# Patient Record
Sex: MALE | Race: BLACK OR AFRICAN AMERICAN | NOT HISPANIC OR LATINO | Employment: FULL TIME | URBAN - METROPOLITAN AREA
[De-identification: names, ages, dates, MRNs, and addresses within clinical notes are randomized per-mention and may not be internally consistent; named-entity substitution may affect disease eponyms.]

---

## 2024-04-29 ENCOUNTER — OFFICE VISIT (OUTPATIENT)
Dept: FAMILY MEDICINE | Facility: CLINIC | Age: 29
End: 2024-04-29
Payer: COMMERCIAL

## 2024-04-29 VITALS
WEIGHT: 137.1 LBS | OXYGEN SATURATION: 100 % | HEIGHT: 67 IN | BODY MASS INDEX: 21.52 KG/M2 | DIASTOLIC BLOOD PRESSURE: 60 MMHG | RESPIRATION RATE: 18 BRPM | TEMPERATURE: 97.8 F | SYSTOLIC BLOOD PRESSURE: 98 MMHG | HEART RATE: 62 BPM

## 2024-04-29 DIAGNOSIS — E83.119 HEMOCHROMATOSIS, UNSPECIFIED HEMOCHROMATOSIS TYPE: ICD-10-CM

## 2024-04-29 DIAGNOSIS — M54.50 CHRONIC BILATERAL LOW BACK PAIN WITHOUT SCIATICA: ICD-10-CM

## 2024-04-29 DIAGNOSIS — Z11.4 SCREENING FOR HIV (HUMAN IMMUNODEFICIENCY VIRUS): ICD-10-CM

## 2024-04-29 DIAGNOSIS — Z13.1 SCREENING FOR DIABETES MELLITUS: ICD-10-CM

## 2024-04-29 DIAGNOSIS — Z13.220 LIPID SCREENING: ICD-10-CM

## 2024-04-29 DIAGNOSIS — Z11.59 NEED FOR HEPATITIS C SCREENING TEST: ICD-10-CM

## 2024-04-29 DIAGNOSIS — D57.20 SICKLE CELL-HEMOGLOBIN C DISEASE WITHOUT CRISIS (H): ICD-10-CM

## 2024-04-29 DIAGNOSIS — G89.29 CHRONIC BILATERAL LOW BACK PAIN WITHOUT SCIATICA: ICD-10-CM

## 2024-04-29 DIAGNOSIS — Z00.00 ROUTINE GENERAL MEDICAL EXAMINATION AT A HEALTH CARE FACILITY: Primary | ICD-10-CM

## 2024-04-29 PROCEDURE — 36415 COLL VENOUS BLD VENIPUNCTURE: CPT | Performed by: NURSE PRACTITIONER

## 2024-04-29 PROCEDURE — 86803 HEPATITIS C AB TEST: CPT | Performed by: NURSE PRACTITIONER

## 2024-04-29 PROCEDURE — 87389 HIV-1 AG W/HIV-1&-2 AB AG IA: CPT | Performed by: NURSE PRACTITIONER

## 2024-04-29 PROCEDURE — 99213 OFFICE O/P EST LOW 20 MIN: CPT | Mod: 25 | Performed by: NURSE PRACTITIONER

## 2024-04-29 PROCEDURE — 80061 LIPID PANEL: CPT | Performed by: NURSE PRACTITIONER

## 2024-04-29 PROCEDURE — 99385 PREV VISIT NEW AGE 18-39: CPT | Performed by: NURSE PRACTITIONER

## 2024-04-29 PROCEDURE — 82947 ASSAY GLUCOSE BLOOD QUANT: CPT | Performed by: NURSE PRACTITIONER

## 2024-04-29 RX ORDER — FOLIC ACID 1 MG/1
1 TABLET ORAL
COMMUNITY
Start: 2024-01-15

## 2024-04-29 SDOH — HEALTH STABILITY: PHYSICAL HEALTH: ON AVERAGE, HOW MANY DAYS PER WEEK DO YOU ENGAGE IN MODERATE TO STRENUOUS EXERCISE (LIKE A BRISK WALK)?: 4 DAYS

## 2024-04-29 SDOH — HEALTH STABILITY: PHYSICAL HEALTH: ON AVERAGE, HOW MANY MINUTES DO YOU ENGAGE IN EXERCISE AT THIS LEVEL?: 30 MIN

## 2024-04-29 ASSESSMENT — SOCIAL DETERMINANTS OF HEALTH (SDOH): HOW OFTEN DO YOU GET TOGETHER WITH FRIENDS OR RELATIVES?: TWICE A WEEK

## 2024-04-29 NOTE — COMMUNITY RESOURCES LIST (ENGLISH)
April 29, 2024           YOUR PERSONALIZED LIST OF SERVICES & PROGRAMS               Bill Payment Assistance      [employer] [place] [employer] - [facility] [employer] [facility] [place]  [address] [place] [address] (Distance: 1.5 miles)  Phone: [phone]  Language: English  Fee: Free  Accessibility: Ada accessible      [facility] - Utility payment assistance  [address] [place] [address] (Distance: 0.5 miles)  Language: English  Fee: Free      - Dislocated Worker/Adult [employer] Employment Program  Phone: [phone]  Email: [email or web address]  Website: [email or web address]/services/employment-services/[email or web address]-worker-program/  Language: English, Chinese  Hours: Mon 8:00 AM - 4:30 PM Tue 8:00 AM - 4:30 PM Wed 8:00 AM - 4:30 PM Thu 8:00 AM - 4:30 PM Fri 8:00 AM - 4:30 PM  Fee: Free  Accessibility: Ada accessible               IMPORTANT NUMBERS & WEBSITES        Emergency Services  911  .   [facility]  211 http://211unitedway.org  .   Poison Control  (667) 222-6626 http://[place]poison.org http://wis[place]poison.org  .     Suicide and Crisis Lifeline  988 http://988lifeline.org  .   Childhelp "[place] Child Abuse Hotline  454.148.2784 http://Childhelphotline.org   .   "[place]" Sexual Assault Hotline  (513) 831-6700 (HOPE) http://Rainn.org   .     National Runaway Safeline  (753) 147-2035 ([place]) http://1800runaway.org  .   Pregnancy & Postpartum Support  Call/text 663-399-0887  [place]: http://ppsupportmn.org  [place]: http://Novafora.com/wi  .   Substance Abuse National Helpline ([facility])  344-470-HELP (6701) http://Findtreatment.gov   .                DISCLAIMER: These resources have been generated via the [employer] Platform. [employer] does not endorse any service providers mentioned in this resource list. [employer] does not guarantee that the services mentioned in this resource list will be available to you or will improve your health or wellness.    [facility]

## 2024-04-29 NOTE — COMMUNITY RESOURCES LIST (ENGLISH)
April 29, 2024           YOUR PERSONALIZED LIST OF SERVICES & PROGRAMS               Bill Payment Assistance      [employer] [place] - [employer] - [facility] [employer] [facility] [place]  [address] [place] [address] (Distance: 1.5 miles)  Phone: [phone]  Language: English  Fee: Free  Accessibility: Ada accessible      [facility] - Utility payment assistance  [address] [place] [address] (Distance: 0.5 miles)  Language: English  Fee: Free      - Dislocated Worker/Adult [employer] Employment Program  Phone: [phone]  Email: [email or web address]  Website: [email or web address]-services/[email or web address]-worker-program/  Language: English, Kittitian  Hours: Mon 8:00 AM - 4:30 PM Tue 8:00 AM - 4:30 PM Wed 8:00 AM - 4:30 PM Thu 8:00 AM - 4:30 PM Fri 8:00 AM - 4:30 PM  Fee: Free  Accessibility: Ada accessible               IMPORTANT NUMBERS & WEBSITES        Emergency Services  911  .   [facility]  211 http://211unitedway.org  .   Poison Control  (268) 331-7824 http://[place]poison.org http://wis[place]poison.org  .     Suicide and Crisis Lifeline  988 http://988lifeline.org  .   Childhelp [place] Child Abuse Hotline  362.984.3307 http://Childhelphotline.org   .   [place] Sexual Assault Hotline  (321) 106-4149 (HOPE) http://Rainn.org   .     National Runaway Safeline  (281) 642-5644 ([place]) http://1800runaway.org  .   Pregnancy & Postpartum Support  Call/text 233-637-7742  [place]: http://ppsupportmn.org  [place]: http://TouchLocal.com/wi  .   Substance Abuse National Helpline ([facility])  608-381-HELP (0646) http://Findtreatment.gov   .                DISCLAIMER: These resources have been generated via the [employer] Platform. [employer] does not endorse any service providers mentioned in this resource list. [employer] does not guarantee that the services mentioned in this resource list will be available to you or will improve your health or wellness.    [facility]

## 2024-04-29 NOTE — PROGRESS NOTES
Assessment & Plan     ICD-10-CM    1. Routine general medical examination at a health care facility  Z00.00       2. Screening for HIV (human immunodeficiency virus)  Z11.4 HIV Antigen Antibody Combo     HIV Antigen Antibody Combo      3. Need for hepatitis C screening test  Z11.59 Hepatitis C Screen Reflex to HCV RNA Quant and Genotype     Hepatitis C Screen Reflex to HCV RNA Quant and Genotype      4. Chronic bilateral low back pain without sciatica    No red flag symptoms  Refer to PT and if not improving Follow-up as indicated  M54.50 Physical Therapy  Referral    G89.29       5. Lipid screening  Z13.220 Lipid Profile (Chol, Trig, HDL, LDL calc)     Lipid Profile (Chol, Trig, HDL, LDL calc)      6. Screening for diabetes mellitus  Z13.1 Glucose     Glucose      7. 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 Hemochromatosis, unspecified hemochromatosis type    Uncertain care plan for this diagnosis  Patient is scheduled follow up to assure back pain improving and discuss possible hemachromatosis diagnosis further. E83.119           MARJAN Laguerre CNP  [facility] [place]  ============================================  Subjective   Donato Galicia is a 29 year old male   here for a Physical Exam    Chronic bilateral low back pain without sciatica  Ongoing back back pain which is bilateral.  Not associated with any bowel or bladder control problems not associated with any radiation into legs.  No fever or chills.  No worsening at night.  Making it difficult to do activities of daily living but has not changed work patterns or home activities at this time.  Is interested in physical therapy referral.    Sickle cell-hemoglobin C disease (H)  Patient has not had crisis since 2020.      Hemochromatosis  Patient currently not being seen for this issue.  To follow up with him and further clarify this diagnosis.      Health Care  Directive    Patient Care Team:  Ada Wharton APRN CNP as PCP - General (Family Practice)  Yasir Dempsey MD as MD (Dermatology)        4/29/2024   General Health   How would you rate your overall physical health? Good   Feel stress (tense, anxious, or unable to sleep) Not at all         4/29/2024   Nutrition   Three or more servings of calcium each day? (!) NO   Diet: Regular (no restrictions)   How many servings of fruit and vegetables per day? (!) 2-3   How many sweetened beverages each day? 0-1         4/29/2024   Exercise   Days per week of moderate/strenous exercise 4 days   Average minutes spent exercising at this level 30 min           4/29/2024   Social Factors   Frequency of gathering with friends or relatives Twice a week   Worry food won't last until get money to buy more No    No   Food not last or not have enough money for food? No    No   Do you have housing?  Yes    Yes   Are you worried about losing your housing? No    No   Lack of transportation? No    No   Unable to get utilities (heat,electricity)? No    Yes   Want help with housing or utility concern? No         4/29/2024   Dental   Dentist two times every year? Yes         4/29/2024   TB Screening   Were you born outside of the [place]? Yes         Today's PHQ-2 Score:       4/29/2024     3:50 PM   PHQ-2 ( 1999 [employer])   Q1: Little interest or pleasure in doing things 2   Q2: Feeling down, depressed or hopeless 0   PHQ-2 Score 2   Q1: Little interest or pleasure in doing things More than half the days   Q2: Feeling down, depressed or hopeless Not at all   PHQ-2 Score 2             4/29/2024   Substance Use   Alcohol more than 3/day or more than 7/wk No   Do you use any other substances recreationally? No       Social History     Tobacco Use    Smoking status: Never    Smokeless tobacco: Never             4/29/2024   One time HIV Screening   Previous HIV test? No         4/29/2024   STI Screening   New sexual partner(s) since last STI/HIV test?  "No         4/29/2024   Contraception/Family Planning   Questions about contraception or family planning No        The Following Health Maintenance Topics are reviewed and are correct as of today:  Health Maintenance   Topic Date Due    ADVANCE CARE PLANNING  Never done    MENINGITIS IMMUNIZATION (1 - Risk 2-dose series) Never done    Pneumococcal Vaccine: Pediatrics (0 to 5 Years) and At-Risk Patients (6 to 64 Years) (1 of 2 - PCV) Never done    HEPATITIS B IMMUNIZATION (1 of 3 - 19+ 3-dose series) Never done    DTAP/TDAP/TD IMMUNIZATION (1 - Tdap) Never done    COVID-19 Vaccine (3 - 2023-24 season) 09/01/2023    INFLUENZA VACCINE (Season Ended) 09/01/2024    YEARLY PREVENTIVE VISIT  04/29/2025    ANNUAL REVIEW OF HM ORDERS  04/29/2025    HEPATITIS C SCREENING  Completed    HIV SCREENING  Completed    PHQ-2 (once per calendar year)  Completed    IPV IMMUNIZATION  Aged Out    HPV IMMUNIZATION  Aged Out    RSV MONOCLONAL ANTIBODY  Aged Out     Reviewed and updated as needed this visit by Provider   Tobacco  Allergies  Meds  Problems  Med Hx  Surg Hx  Fam Hx           HPI  Review of Systems   Constitutional:  Negative for chills, fever and unexpected weight change.   HENT: Negative.     Respiratory:  Negative for chest tightness and shortness of breath.    Cardiovascular:  Negative for chest pain and palpitations.   Gastrointestinal:  Negative for abdominal pain, constipation and diarrhea.   Genitourinary:  Negative for dysuria.   Musculoskeletal:  Positive for back pain.   Skin:  Negative for rash.   Neurological: Negative.  Negative for weakness and paresthesias.   Psychiatric/Behavioral: Negative.       ============================================    Objective    Exam  BP 98/60 (BP Location: Right arm, Patient Position: Sitting, Cuff Size: Adult Regular)   Pulse 62   Temp 97.8  F (36.6  C) (Oral)   Resp 18   Ht 1.702 m (5' 7\")   Wt 62.2 kg (137 lb 1.6 oz)   SpO2 100%   BMI 21.47 kg/m    Physical " Exam  Constitutional:       Appearance: Normal appearance.   HENT:      Right Ear: Tympanic membrane normal.      Left Ear: Tympanic membrane normal.      Nose: Nose normal.      Mouth/Throat:      Mouth: Mucous membranes are moist.      Pharynx: Oropharynx is clear.   Eyes:      Conjunctiva/sclera: Conjunctivae normal.   Cardiovascular:      Rate and Rhythm: Normal rate and regular rhythm.      Heart sounds: Normal heart sounds.   Pulmonary:      Effort: Pulmonary effort is normal.      Breath sounds: Normal breath sounds.   Abdominal:      General: Abdomen is flat. Bowel sounds are normal.      Palpations: Abdomen is soft.      Tenderness: There is no abdominal tenderness.   Musculoskeletal:      Cervical back: Neck supple.      Right lower leg: No edema.      Left lower leg: No edema.      Comments: Straight leg raise is negative at 90 degrees on both sides. DTR's, motor strength and sensation normal, including heel and toe gait.  Peripheral pulses are palpable. Hips and knees have full range of motion without pain.     Skin:     General: Skin is warm and dry.      Findings: No rash.   Neurological:      Mental Status: He is alert.   Psychiatric:         Mood and Affect: Mood normal.           Signed Electronically by: MARJAN Laguerre CNP

## 2024-04-29 NOTE — COMMUNITY RESOURCES LIST (ENGLISH)
April 29, 2024           YOUR PERSONALIZED LIST OF SERVICES & PROGRAMS               Bill Payment Assistance      [employer] [place] [employer] - [facility] [employer] [place] [employer]  [address] [place] [address] (Distance: 1.5 miles)  Phone: [phone]  Language: English  Fee: Free  Accessibility: Ada accessible      [facility] - Utility payment assistance  [address] [place] [address] (Distance: 0.5 miles)  Language: English  Fee: Free      - Dislocated Worker/Adult [employer] Employment Program  Phone: [phone]  Email: [email or web address]  Website: [email or web address]  Language: English, Chilean  Hours: Mon 8:00 AM - 4:30 PM Tue 8:00 AM - 4:30 PM Wed 8:00 AM - 4:30 PM Thu 8:00 AM - 4:30 PM Fri 8:00 AM - 4:30 PM  Fee: Free  Accessibility: Ada accessible               IMPORTANT NUMBERS & WEBSITES        Emergency Services  911  .   [facility]  211 http://211unitedway.org  .   Poison Control  (735) 363-1783 http://mnpoison.org http://wis[place]poison.org  .     Suicide and Crisis Lifeline  988 http://988lifeline.org  .   Childhelp [place] Child Abuse Hotline  215.102.3957 http://Childhelphotline.org   .   [place] Sexual Assault Hotline  (690) 913-1687 (HOPE) http://Rainn.org   .     National Runaway Safeline  (715) 370-8375 ([place]) http://1800runaway.org  .   Pregnancy & Postpartum Support  Call/text 646-461-9722  [place]: http://ppsupportmn.org  [place]: http://LifeOnKey.com/wi  .   Substance Abuse National Helpline ([facility])  161-315-HELP (0364) http://Findtreatment.gov   .                DISCLAIMER: These resources have been generated via the [employer] Platform. [employer] does not endorse any service providers mentioned in this resource list. [employer] does not guarantee that the services mentioned in this resource list will be available to you or will improve your health or wellness.    [facility]

## 2024-04-29 NOTE — PATIENT INSTRUCTIONS
Please bring in your immunization records from 5 years ago so our staff can update your records.  Preventive Care Advice   This is general advice given by our system to help you stay healthy. However, your care team may have specific advice just for you. Please talk to your care team about your preventive care needs.  Nutrition  Eat 5 or more servings of fruits and vegetables each day.  Try wheat bread, brown rice and whole grain pasta (instead of white bread, rice, and pasta).  Get enough calcium and vitamin D. Check the label on foods and aim for 100% of the RDA (recommended daily allowance).  Lifestyle  Exercise at least 150 minutes each week   (30 minutes a day, 5 days a week).  Do muscle strengthening activities 2 days a week. These help control your weight and prevent disease.  No smoking.  Wear sunscreen to prevent skin cancer.  Have a dental exam and cleaning every 6 months.  Yearly exams  See your health care team every year to talk about:  Any changes in your health.  Any medicines your care team has prescribed.  Preventive care, family planning, and ways to prevent chronic diseases.  Shots (vaccines)   HPV shots (up to age 26), if you've never had them before.  Hepatitis B shots (up to age 59), if you've never had them before.  COVID-19 shot: Get this shot when it's due.  Flu shot: Get a flu shot every year.  Tetanus shot: Get a tetanus shot every 10 years.  Pneumococcal, hepatitis A, and RSV shots: Ask your care team if you need these based on your risk.  Shingles shot (for age 50 and up).  General health tests  Diabetes screening:  Starting at age 35, Get screened for diabetes at least every 3 years.  If you are younger than age 35, ask your care team if you should be screened for diabetes.  Cholesterol test: At age 39, start having a cholesterol test every 5 years, or more often if advised.  Bone density scan (DEXA): At age 50, ask your care team if you should have this scan for osteoporosis (brittle  bones).  Hepatitis C: Get tested at least once in your life.  STIs (sexually transmitted infections)  Before age 24: Ask your care team if you should be screened for STIs.  After age 24: Get screened for STIs if you're at risk. You are at risk for STIs (including HIV) if:  You are sexually active with more than one person.  You don't use condoms every time.  You or a partner was diagnosed with a sexually transmitted infection.  If you are at risk for HIV, ask about PrEP medicine to prevent HIV.  Get tested for HIV at least once in your life, whether you are at risk for HIV or not.  Cancer screening tests  Cervical cancer screening: If you have a cervix, begin getting regular cervical cancer screening tests at age 21. Most people who have regular screenings with normal results can stop after age 65. Talk about this with your provider.  Breast cancer scan (mammogram): If you've ever had breasts, begin having regular mammograms starting at age 40. This is a scan to check for breast cancer.  Colon cancer screening: It is important to start screening for colon cancer at age 45.  Have a colonoscopy test every 10 years (or more often if you're at risk) Or, ask your provider about stool tests like a FIT test every year or Cologuard test every 3 years.  To learn more about your testing options, visit: https://www.[employer]/880016.pdf.  For help making a decision, visit: https://bit.ly/et72118.  Prostate cancer screening test: If you have a prostate and are age 55 to 69, ask your provider if you would benefit from a yearly prostate cancer screening test.  Lung cancer screening: If you are a current or former smoker age 50 to 80, ask your care team if ongoing lung cancer screenings are right for you.  For informational purposes only. Not to replace the advice of your health care provider. Copyright   2023 [employer]. All rights reserved. Clinically reviewed by the [facility] Transitions Program.  "[employer]" 064094 - REV 01/24.

## 2024-04-30 LAB
CHOLEST SERPL-MCNC: 95 MG/DL
FASTING STATUS PATIENT QL REPORTED: YES
FASTING STATUS PATIENT QL REPORTED: YES
GLUCOSE SERPL-MCNC: 85 MG/DL (ref 70–99)
HCV AB SERPL QL IA: NONREACTIVE
HDLC SERPL-MCNC: 29 MG/DL
HIV 1+2 AB+HIV1 P24 AG SERPL QL IA: NONREACTIVE
LDLC SERPL CALC-MCNC: 53 MG/DL
NONHDLC SERPL-MCNC: 66 MG/DL
TRIGL SERPL-MCNC: 64 MG/DL

## 2024-05-03 PROBLEM — M54.50 CHRONIC BILATERAL LOW BACK PAIN WITHOUT SCIATICA: Status: ACTIVE | Noted: 2024-05-03

## 2024-05-03 PROBLEM — G89.29 CHRONIC BILATERAL LOW BACK PAIN WITHOUT SCIATICA: Status: ACTIVE | Noted: 2024-05-03

## 2024-05-03 ASSESSMENT — ENCOUNTER SYMPTOMS
FEVER: 0
DYSURIA: 0
PARESTHESIAS: 0
SHORTNESS OF BREATH: 0
CONSTIPATION: 0
PALPITATIONS: 0
UNEXPECTED WEIGHT CHANGE: 0
WEAKNESS: 0
NEUROLOGICAL NEGATIVE: 1
BACK PAIN: 1
CHEST TIGHTNESS: 0
PSYCHIATRIC NEGATIVE: 1
CHILLS: 0
DIARRHEA: 0
ABDOMINAL PAIN: 0

## 2024-05-03 NOTE — ASSESSMENT & PLAN NOTE
Ongoing back back pain which is bilateral.  Not associated with any bowel or bladder control problems not associated with any radiation into legs.  No fever or chills.  No worsening at night.  Making it difficult to do activities of daily living but has not changed work patterns or home activities at this time.  Is interested in physical therapy referral.

## 2024-05-03 NOTE — ASSESSMENT & PLAN NOTE
Patient currently not being seen for this issue.  To follow up with him and further clarify this diagnosis.

## 2025-02-17 ENCOUNTER — MYC REFILL (OUTPATIENT)
Dept: FAMILY MEDICINE | Facility: CLINIC | Age: 30
End: 2025-02-17
Payer: COMMERCIAL

## 2025-02-17 DIAGNOSIS — D57.20 SICKLE CELL-HEMOGLOBIN C DISEASE WITHOUT CRISIS (H): Primary | ICD-10-CM

## 2025-02-17 NOTE — TELEPHONE ENCOUNTER
Clinic RN: Please investigate patient's chart or contact patient if the information cannot be found because the medication is listed as historical or discontinued. Confirm patient is taking this medication. Document findings and route refill encounter to provider for approval or denial.     Jennifer FONSECA RN  [facility]

## 2025-02-20 RX ORDER — FOLIC ACID 1 MG/1
1000 TABLET ORAL DAILY
Qty: 90 TABLET | Refills: 3 | Status: SHIPPED | OUTPATIENT
Start: 2025-02-20

## 2025-06-08 ENCOUNTER — HEALTH MAINTENANCE LETTER (OUTPATIENT)
Age: 30
End: 2025-06-08